# Patient Record
Sex: FEMALE | Race: AMERICAN INDIAN OR ALASKA NATIVE
[De-identification: names, ages, dates, MRNs, and addresses within clinical notes are randomized per-mention and may not be internally consistent; named-entity substitution may affect disease eponyms.]

---

## 2020-01-15 ENCOUNTER — HOSPITAL ENCOUNTER (OUTPATIENT)
Dept: HOSPITAL 5 - PF | Age: 55
Discharge: HOME | End: 2020-01-15
Attending: INTERNAL MEDICINE
Payer: COMMERCIAL

## 2020-01-15 DIAGNOSIS — E11.9: ICD-10-CM

## 2020-01-15 DIAGNOSIS — M48.07: Primary | ICD-10-CM

## 2020-01-15 DIAGNOSIS — M76.891: ICD-10-CM

## 2020-01-15 DIAGNOSIS — F31.9: ICD-10-CM

## 2020-01-15 PROCEDURE — 94729 DIFFUSING CAPACITY: CPT

## 2020-01-15 PROCEDURE — 72100 X-RAY EXAM L-S SPINE 2/3 VWS: CPT

## 2020-01-15 PROCEDURE — 94010 BREATHING CAPACITY TEST: CPT
